# Patient Record
Sex: MALE | Race: WHITE | NOT HISPANIC OR LATINO | Employment: FULL TIME | URBAN - NONMETROPOLITAN AREA
[De-identification: names, ages, dates, MRNs, and addresses within clinical notes are randomized per-mention and may not be internally consistent; named-entity substitution may affect disease eponyms.]

---

## 2023-05-21 ENCOUNTER — OFFICE VISIT (OUTPATIENT)
Dept: URGENT CARE | Facility: CLINIC | Age: 63
End: 2023-05-21

## 2023-05-21 VITALS
RESPIRATION RATE: 16 BRPM | HEIGHT: 69 IN | BODY MASS INDEX: 31.1 KG/M2 | TEMPERATURE: 97.3 F | DIASTOLIC BLOOD PRESSURE: 76 MMHG | WEIGHT: 210 LBS | HEART RATE: 72 BPM | SYSTOLIC BLOOD PRESSURE: 139 MMHG | OXYGEN SATURATION: 98 %

## 2023-05-21 DIAGNOSIS — L03.116 CELLULITIS OF LEFT LOWER EXTREMITY: Primary | ICD-10-CM

## 2023-05-21 RX ORDER — ZINC GLUCONATE 50 MG
50 TABLET ORAL DAILY
COMMUNITY

## 2023-05-21 RX ORDER — TADALAFIL 20 MG/1
TABLET ORAL DAILY PRN
COMMUNITY
Start: 2023-01-30

## 2023-05-21 RX ORDER — UBIDECARENONE 100 MG
100 CAPSULE ORAL DAILY
COMMUNITY

## 2023-05-21 RX ORDER — ASPIRIN 325 MG
1 TABLET ORAL DAILY
COMMUNITY

## 2023-05-21 RX ORDER — CEPHALEXIN 500 MG/1
500 CAPSULE ORAL EVERY 6 HOURS SCHEDULED
Qty: 28 CAPSULE | Refills: 0 | Status: SHIPPED | OUTPATIENT
Start: 2023-05-21 | End: 2023-05-28

## 2023-05-21 RX ORDER — EZETIMIBE 10 MG/1
10 TABLET ORAL DAILY
COMMUNITY
Start: 2022-12-19

## 2023-05-21 RX ORDER — CANDESARTAN 8 MG/1
8 TABLET ORAL DAILY
COMMUNITY
Start: 2023-05-11

## 2023-05-21 RX ORDER — ATORVASTATIN CALCIUM 40 MG/1
40 TABLET, FILM COATED ORAL DAILY
COMMUNITY
Start: 2023-04-15

## 2023-05-21 RX ORDER — WARFARIN SODIUM 4 MG/1
4 TABLET ORAL
COMMUNITY

## 2023-05-21 NOTE — PATIENT INSTRUCTIONS
Take antibiotics as prescribed  Ice area   Elevate leg   Monitor any redness spreading or worsening symptoms  Follow up with PCP in 3-5 days  Proceed to  ER if symptoms worsen

## 2023-05-21 NOTE — PROGRESS NOTES
"  [facility]        NAME: Mateo Edwards is a 57 y o  male  : 1960    MRN: [id number]  DATE: May 21, 2023  TIME: 2:13 PM    Assessment and Plan   Cellulitis of left lower extremity [L03 116]  1  Cellulitis of left lower extremity  cephalexin (KEFLEX) 500 mg capsule        Started on abx  Used skin marker to dictate red area  Stated if it continued to spread, even after the use of abx, or any symptoms worsen, to go to the ER for further evaluation  Patient Instructions     Take antibiotics as prescribed  Ice area   Elevate leg   Monitor any redness spreading or worsening symptoms  Follow up with PCP in 3-5 days  Proceed to  ER if symptoms worsen  Chief Complaint     Chief Complaint   Patient presents with   • Possible insect bite     Reports noted red area  with small dark dot in middle,approximate size of a quarter on left lower leg 4 days ago  Area has become increasingly reddend and discolored and now covers a 5 1/4\" by 5 1/12\" area which is warm to the touch  C/O burning sensation over the area  History of Present Illness       Patient is presenting for a red area that started on his left lower extremity 4 days ago  He is unsure if he got bit by a spider  The area has been consistently getting larger, redder, and warmer  It started as the size of a quarter and is now 5 1/4\" by 5 1/2\"  3 days ago he applied Cortisone for itchiness  He stated it is now a burning pain and the past 2 days it's been more painful  He's tried ice packs which helped the pain  He denies any numbness, tingling, weakness, or gait abnormalities  Denies any fever, body aches, CP, SOB, or throat tightness  Review of Systems   Review of Systems   Constitutional: Negative for chills, fatigue and fever  Respiratory: Negative  Cardiovascular: Negative  Musculoskeletal: Negative for myalgias  Skin: Positive for color change  Neurological: Negative  Hematological: Negative            Current " "Medications       Current Outpatient Medications:   •  aspirin 325 mg tablet, Take 1 tablet by mouth daily, Disp: , Rfl:   •  atorvastatin (LIPITOR) 40 mg tablet, Take 40 mg by mouth in the morning, Disp: , Rfl:   •  candesartan (ATACAND) 8 MG tablet, Take 8 mg by mouth in the morning, Disp: , Rfl:   •  cephalexin (KEFLEX) 500 mg capsule, Take 1 capsule (500 mg total) by mouth every 6 (six) hours for 7 days, Disp: 28 capsule, Rfl: 0  •  Cholecalciferol 25 MCG (1000 UT) tablet, Take 1,000 Units by mouth daily, Disp: , Rfl:   •  co-enzyme Q-10 100 mg capsule, Take 100 mg by mouth daily, Disp: , Rfl:   •  ezetimibe (ZETIA) 10 mg tablet, Take 10 mg by mouth in the morning, Disp: , Rfl:   •  tadalafil (CIALIS) 20 MG tablet, Take by mouth daily as needed, Disp: , Rfl:   •  warfarin (COUMADIN) 4 mg tablet, Take 4 mg by mouth daily, Disp: , Rfl:   •  zinc gluconate 50 mg tablet, Take 50 mg by mouth daily, Disp: , Rfl:     Current Allergies     Allergies as of 05/21/2023 - never reviewed   Allergen Reaction Noted   • Bee venom Swelling 04/15/2015            The following portions of the patient's history were reviewed and updated as appropriate: allergies, current medications, past family history, past medical history, past social history, past surgical history and problem list      Past Medical History:   Diagnosis Date   • Blood disorder    • High cholesterol    • Hypertension    • Stroke [facility])        History reviewed  No pertinent surgical history  Family History   Problem Relation Age of Onset   • No Known Problems Mother    • Heart disease Father          Medications have been verified  Objective   /76   Pulse 72   Temp (!) 97 3 °F (36 3 °C)   Resp 16   Ht 5' 9\" (1 753 m)   Wt 95 3 kg (210 lb)   SpO2 98%   BMI 31 01 kg/m²        Physical Exam     Physical Exam  Constitutional:       Appearance: Normal appearance  HENT:      Head: Normocephalic        Nose: Nose normal    Eyes:      Extraocular " Movements: Extraocular movements intact  Pupils: Pupils are equal, round, and reactive to light  Cardiovascular:      Rate and Rhythm: Normal rate and regular rhythm  Pulses: Normal pulses  Heart sounds: Normal heart sounds  Pulmonary:      Effort: Pulmonary effort is normal       Breath sounds: Normal breath sounds  Skin:     General: Skin is warm and dry  Capillary Refill: Capillary refill takes less than 2 seconds  Comments: Picture of area shown below and located in media tab   Neurological:      General: No focal deficit present  Mental Status: He is alert and oriented to person, place, and time  Mental status is at baseline  Psychiatric:         Mood and Affect: Mood normal          Behavior: Behavior normal          Thought Content: Thought content normal          Judgment: Judgment normal               Media Information  Document Information    Clinical Image - Mobile Device   5 1/4” by 5 1/2” area on left lower leg   05/21/2023 2:07 PM   Attached To:    Office Visit on 5/21/23 with Asher Holbrook, [address] [place] [address], PA-C  [facility]